# Patient Record
Sex: MALE | Race: WHITE | HISPANIC OR LATINO | Employment: FULL TIME | URBAN - METROPOLITAN AREA
[De-identification: names, ages, dates, MRNs, and addresses within clinical notes are randomized per-mention and may not be internally consistent; named-entity substitution may affect disease eponyms.]

---

## 2017-03-03 ENCOUNTER — NON-PROVIDER VISIT (OUTPATIENT)
Dept: URGENT CARE | Facility: CLINIC | Age: 57
End: 2017-03-03

## 2017-03-03 DIAGNOSIS — Z02.1 PRE-EMPLOYMENT DRUG SCREENING: ICD-10-CM

## 2017-03-03 LAB
AMP AMPHETAMINE: NORMAL
COC COCAINE: NORMAL
INT CON NEG: NORMAL
INT CON POS: NORMAL
MET METHAMPHETAMINES: NORMAL
OPI OPIATES: NORMAL
PCP PHENCYCLIDINE: NORMAL
POC DRUG COMMENT 753798-OCCUPATIONAL HEALTH: NORMAL
THC: NORMAL

## 2017-03-03 PROCEDURE — 80305 DRUG TEST PRSMV DIR OPT OBS: CPT | Performed by: PHYSICIAN ASSISTANT

## 2017-05-03 ENCOUNTER — OFFICE VISIT (OUTPATIENT)
Dept: URGENT CARE | Facility: CLINIC | Age: 57
End: 2017-05-03

## 2017-05-03 VITALS
DIASTOLIC BLOOD PRESSURE: 80 MMHG | WEIGHT: 155 LBS | BODY MASS INDEX: 24.91 KG/M2 | HEART RATE: 79 BPM | RESPIRATION RATE: 16 BRPM | TEMPERATURE: 98.7 F | HEIGHT: 66 IN | OXYGEN SATURATION: 95 % | SYSTOLIC BLOOD PRESSURE: 100 MMHG

## 2017-05-03 DIAGNOSIS — J06.9 VIRAL UPPER RESPIRATORY ILLNESS: ICD-10-CM

## 2017-05-03 PROCEDURE — 99203 OFFICE O/P NEW LOW 30 MIN: CPT | Performed by: FAMILY MEDICINE

## 2017-05-03 RX ORDER — ACETAMINOPHEN 325 MG/1
650 TABLET ORAL EVERY 4 HOURS PRN
COMMUNITY
End: 2021-03-06

## 2017-05-03 RX ORDER — CODEINE PHOSPHATE AND GUAIFENESIN 10; 100 MG/5ML; MG/5ML
5 SOLUTION ORAL EVERY 6 HOURS PRN
Qty: 240 ML | Refills: 1 | Status: SHIPPED | OUTPATIENT
Start: 2017-05-03 | End: 2021-03-06

## 2017-05-03 ASSESSMENT — ENCOUNTER SYMPTOMS
NECK PAIN: 0
MYALGIAS: 1
BLURRED VISION: 0
COUGH: 1
FATIGUE: 1
ANOREXIA: 0
VOMITING: 0
ARTHRALGIAS: 1
HEADACHES: 1
DIZZINESS: 0
CHILLS: 0
NAUSEA: 0
SORE THROAT: 1
BODY ACHES: 1
FEVER: 0
ABDOMINAL PAIN: 0
DOUBLE VISION: 0
SWOLLEN GLANDS: 0

## 2017-05-03 NOTE — PROGRESS NOTES
"Subjective:      Bennett Bailey is a 56 y.o. male who presents with Generalized Body Aches            Generalized Body Aches  This is a new problem. The current episode started in the past 7 days (3-4 days). The problem occurs constantly. The problem has been gradually worsening. Associated symptoms include arthralgias, congestion, coughing, fatigue, headaches, myalgias and a sore throat. Pertinent negatives include no abdominal pain, anorexia, chest pain, chills, fever, nausea, neck pain, rash, swollen glands or vomiting. Exacerbated by: nighttime. Treatments tried: PCN from underground. The treatment provided no relief.       Review of Systems   Constitutional: Positive for fatigue. Negative for fever and chills.   HENT: Positive for congestion and sore throat.    Eyes: Negative for blurred vision and double vision.   Respiratory: Positive for cough.    Cardiovascular: Negative for chest pain.   Gastrointestinal: Negative for nausea, vomiting, abdominal pain and anorexia.   Musculoskeletal: Positive for myalgias and arthralgias. Negative for neck pain.   Skin: Negative for rash.   Neurological: Positive for headaches. Negative for dizziness.     PMH:  has no past medical history on file.  MEDS:   Current outpatient prescriptions:   •  acetaminophen (TYLENOL) 325 MG Tab, Take 650 mg by mouth every four hours as needed., Disp: , Rfl:   ALLERGIES: No Known Allergies  SURGHX: History reviewed. No pertinent past surgical history.  SOCHX:  reports that he quit smoking about 30 years ago. He does not have any smokeless tobacco history on file. He reports that he drinks alcohol. He reports that he does not use illicit drugs.  FH: Family history was reviewed, no pertinent findings to report       Objective:     /80 mmHg  Pulse 79  Temp(Src) 37.1 °C (98.7 °F)  Resp 16  Ht 1.676 m (5' 6\")  Wt 70.308 kg (155 lb)  BMI 25.03 kg/m2  SpO2 95%     Physical Exam   Constitutional: He appears well-developed.   HENT: "   Head: Normocephalic.   Right Ear: External ear normal.   Left Ear: External ear normal.   Mouth/Throat: Oropharyngeal exudate present.   Nasal congestion    Eyes: Right eye exhibits no discharge. Left eye exhibits no discharge.   Neck: Normal range of motion. No tracheal deviation present. No thyromegaly present.   Cardiovascular: Normal rate.  Exam reveals no friction rub.    No murmur heard.  Pulmonary/Chest: Effort normal. No stridor. No respiratory distress. He has no wheezes.   Abdominal: Soft. He exhibits no distension. There is no tenderness. There is no guarding.   Lymphadenopathy:     He has no cervical adenopathy.   Neurological: He is alert.   Skin: Skin is warm and dry. He is not diaphoretic.   Psychiatric: He has a normal mood and affect. His behavior is normal.               Assessment/Plan:     1. Viral upper respiratory illness  mag hydrox-al hydrox-simeth-diphenhydrAMINE-lidocaine viscous 2%    guaifenesin-codeine (ROBITUSSIN AC) Solution oral solution     Supportive care  Push fluids  Monitor temperature  Follow-up if symptoms worsen or fail to improve

## 2017-05-03 NOTE — PATIENT INSTRUCTIONS
"Infección del tracto respiratorio superior, adultos  (Upper Respiratory Infection, Adult)  La mayoría de las infecciones del tracto respiratorio superior son infecciones virales de las vías que llevan el aire a los pulmones. Un infección del tracto respiratorio superior afecta la nariz, la garganta y las vías respiratorias superiores. El tipo más frecuente de infección del tracto respiratorio superior es la nasofaringitis, que habitualmente se conoce angelique \"resfrío común\".  Las infecciones del tracto respiratorio superior siguen oneill curso y por lo general se curan solas. En la mayoría de los casos, la infección del tracto respiratorio superior no requiere atención médica, rigoberto a veces, después de augusto infección viral, puede surgir augusto infección bacteriana en las vías respiratorias superiores. [place] se conoce angelique infección secundaria. Las infecciones sinusales y en el oído medio son tipos frecuentes de infecciones secundarias en el tracto respiratorio superior.  La neumonía bacteriana también puede complicar un cuadro de infección del tracto respiratorio superior. Blanca tipo de infección puede empeorar el asma y la enfermedad pulmonar obstructiva crónica (EPOC). En algunos casos, estas complicaciones pueden requerir atención médica de emergencia y poner en peligro la luis.   CAUSAS  Jolly todas las infecciones del tracto respiratorio superior se deben a los virus. Un virus es un tipo de microbio que puede contagiarse de augusto persona a otra.   FACTORES DE RIESGO  Puede estar en riesgo de sufrir augusto infección del tracto respiratorio superior si:   · Fuma.  · Tiene augusto enfermedad pulmonar o cardíaca crónica.  · Tiene debilitado el sistema de defensa (inmunitario) del cuerpo.  · Es muy joven o de edad muy avanzada.  · Tiene asma o alergias nasales.  · Trabaja en áreas donde hay mucha gente o poca ventilación.  · Trabaja en augusto escuela o en un centro de atención médica.  SIGNOS Y SÍNTOMAS   Habitualmente, los síntomas aparecen " de 2 a 3 días después de entrar en contacto con el virus del resfrío. La mayoría de las infecciones virales en el tracto respiratorio superior huerta de 7 a 10 días. Sin embargo, las infecciones virales en el tracto respiratorio superior a causa del virus de la gripe pueden durar de 14 a 18 días y, habitualmente, son más graves. Entre los síntomas se pueden incluir los siguientes:   · Secreción o congestión nasal.  · Estornudos.  · Tos.  · Dolor de garganta.  · Dolor de je.  · Fatiga.  · Fiebre.  · Pérdida del apetito.  · Dolor en la frente, detrás de los ojos y por encima de los pómulos (dolor sinusal).  · Jennifer musculares.  DIAGNÓSTICO   El médico puede diagnosticar augusto infección del tracto respiratorio superior mediante los siguientes estudios:  · Examen físico.  · Pruebas para verificar si los síntomas no se deben a otra afección, por ejemplo:  ¨ Faringitis estreptocócica.  ¨ Sinusitis.  ¨ Neumonía.  ¨ Asma.  TRATAMIENTO   Esta infección desaparece emilio, con el tiempo. No puede curarse con medicamentos, rigoberto a menudo se prescriben para aliviar los síntomas. Los medicamentos pueden ser útiles para lo siguiente:  · Bajar la fiebre.  · Reducir la tos.  · Aliviar la congestión nasal.  INSTRUCCIONES PARA EL CUIDADO EN EL HOGAR   · Munjor los medicamentos solamente angelique se lo haya indicado el médico.  · A fin de aliviar el dolor de garganta, hossein gárgaras con solución salina templada o consuma caramelos para la tos, angelique se lo haya indicado el médico.  · Use un humidificador de vapor cálido o inhale el vapor de la ducha para aumentar la humedad del aire. [place] facilitará la respiración.  · Shannon suficiente líquido para mantener la orina shahram o de color amarillo pálido.  · Consuma sopas y otros caldos transparentes, y aliméntese edward.  · Descanse todo lo que sea necesario.  · Regrese al trabajo cuando la temperatura se le haya normalizado o cuando el médico lo autorice. Es posible que deba quedarse en oneill casa garrick  un tiempo prolongado, para no infectar a los demás. También puede usar un barbijo y lavarse las peter con cuidado para evitar la propagación del virus.  · Aumente el uso del inhalador si tiene asma.  · No consuma ningún producto que contenga tabaco, lo que incluye cigarrillos, tabaco de mascar o cigarrillos electrónicos. Si necesita ayuda para dejar de fumar, consulte al médico.  PREVENCIÓN   La mejor manera de protegerse de un resfrío es mantener augusto higiene adecuada.   · Evite el contacto oral o físico con personas que tengan síntomas de resfrío.  · En jeovany de contacto, lávese las peter con frecuencia.  No hay pruebas claras de que la vitamina C, la vitamina E, la equinácea o el ejercicio reduzcan la probabilidad de contraer un resfrío. Sin embargo, siempre se recomienda descansar mucho, hacer ejercicio y alimentarse edward.   SOLICITE ATENCIÓN MÉDICA SI:   · Oneill estado empeora en lugar de mejorar.  · Los medicamentos no logran controlar los síntomas.  · Tiene escalofríos.  · La sensación de falta de aire empeora.  · Tiene mucosidad marrón o arielle.  · Tiene secreción nasal amarilla o marrón.  · Le duele la ariadna, especialmente al inclinarse hacia adelante.  · Tiene fiebre.  · Tiene los ganglios del rebecca hinchados.  · Siente dolor al tragar.  · Tiene zonas magnus en la parte de atrás de la garganta.  SOLICITE ATENCIÓN MÉDICA DE INMEDIATO SI:   · Tiene síntomas intensos o persistentes de:  ¨ Dolor de je.  ¨ Dolor de oídos.  ¨ Dolor sinusal.  ¨ Dolor en el pecho.  · Tiene enfermedad pulmonar crónica y cualquiera de estos síntomas:  ¨ Sibilancias.  ¨ Tos prolongada.  ¨ Tos con les.  ¨ Cambio en la mucosidad habitual.  · Presenta rigidez en el rebecca.  · Tiene cambios en:  ¨ La visión.  ¨ La audición.  ¨ El pensamiento.  ¨ El estado de ánimo.  ASEGÚRESE DE QUE:   · Comprende estas instrucciones.  · Controlará oneill afección.  · Recibirá ayuda de inmediato si no mejora o si empeora.     Esta información no tiene angelique  fin reemplazar el consejo del médico. Asegúrese de hacerle al médico cualquier pregunta que tenga.     Document Released: 09/27/2006 Document Revised: 05/03/2016  Elsevier Interactive Patient Education ©2016 Elsevier Inc.      AFRIN 12 horas, no usar mas de augusto vez al esperanza por 4-6 soto

## 2017-06-02 ENCOUNTER — OFFICE VISIT (OUTPATIENT)
Dept: URGENT CARE | Facility: CLINIC | Age: 57
End: 2017-06-02

## 2017-06-02 VITALS
RESPIRATION RATE: 16 BRPM | BODY MASS INDEX: 25.55 KG/M2 | HEART RATE: 72 BPM | SYSTOLIC BLOOD PRESSURE: 110 MMHG | OXYGEN SATURATION: 97 % | HEIGHT: 66 IN | TEMPERATURE: 98.4 F | DIASTOLIC BLOOD PRESSURE: 74 MMHG | WEIGHT: 159 LBS

## 2017-06-02 DIAGNOSIS — Z02.4 DRIVER'S PERMIT PHYSICAL EXAMINATION: ICD-10-CM

## 2017-06-02 PROCEDURE — 7100 PR DOT PHYSICAL: Performed by: NURSE PRACTITIONER

## 2017-06-02 ASSESSMENT — VISUAL ACUITY
OS_CC: 20/20
OD_CC: 20/20

## 2017-06-02 NOTE — MR AVS SNAPSHOT
"Bennett Bailey   2017 6:00 PM   Office Visit   MRN: [id number]    Department:  [facility]   Dept Phone:  [phone]    Description:  Male : 1960   Provider:  Cathey J Hamman, A.P.N.; Cathey J Hamman, A.P.N.           Reason for Visit     Annual Exam DOT       Allergies as of 2017     No Known Allergies      Vital Signs     Blood Pressure Pulse Temperature Respirations Height Weight    110/74 mmHg 72 36.9 °C (98.4 °F) 16 1.676 m (5' 6\") 72.122 kg (159 lb)    Body Mass Index Oxygen Saturation Smoking Status             25.68 kg/m2 97% Former Smoker         Basic Information     Date Of Birth Sex Race Ethnicity Preferred Language    1960 Male White  Origin (Khmer,Italian,Andorran,Joe, etc) English      Health Maintenance        Date Due Completion Dates    IMM DTaP/Tdap/Td Vaccine (1 - Tdap) 1978 ---    COLONOSCOPY 2009 ---            Current Immunizations     No immunizations on file.      Below and/or attached are the medications your provider expects you to take. Review all of your home medications and newly ordered medications with your provider and/or pharmacist. Follow medication instructions as directed by your provider and/or pharmacist. Please keep your medication list with you and share with your provider. Update the information when medications are discontinued, doses are changed, or new medications (including over-the-counter products) are added; and carry medication information at all times in the event of emergency situations     Allergies:  No Known Allergies          Medications  Valid as of: 2017 -  6:52 PM    Generic Name Brand Name Tablet Size Instructions for use    Acetaminophen (Tab) TYLENOL 325 MG Take 650 mg by mouth every four hours as needed.        Guaifenesin-Codeine (Solution) ROBITUSSIN -10 mg/5mL Take 5 mL by mouth every 6 hours as needed for Cough.        .                 Medicines prescribed today were " sent to:     None      Medication refill instructions:       If your prescription bottle indicates you have medication refills left, it is not necessary to call your provider’s office. Please contact your pharmacy and they will refill your medication.    If your prescription bottle indicates you do not have any refills left, you may request refills at any time through one of the following ways: The online Social Tools system (except Urgent Care), by calling your provider’s office, or by asking your pharmacy to contact your provider’s office with a refill request. Medication refills are processed only during regular business hours and may not be available until the next business day. Your provider may request additional information or to have a follow-up visit with you prior to refilling your medication.   *Please Note: Medication refills are assigned a new Rx number when refilled electronically. Your pharmacy may indicate that no refills were authorized even though a new prescription for the same medication is available at the pharmacy. Please request the medicine by name with the pharmacy before contacting your provider for a refill.           Social Tools Access Code: [email or web address]  Expires: 7/2/2017  4:17 AM    Your email address is not on file at [facility].  Email Addresses are required for you to sign up for Social Tools, please contact [phone] to verify your personal information and to provide your email address prior to attempting to register for Social Tools.    Bennett Bailey  [address] [address] [place] [address]    Social Tools  A secure, online tool to manage your health information     [facility]’s Social Tools® is a secure, online tool that connects you to your personalized health information from the privacy of your home -- day or night - making it very easy for you to manage your healthcare. Once the activation process is completed, you can even access your medical information using the Social Tools tejas,  which is available for free in the Apple Jonn store or Google Play store.     To learn more about [employer], visit [email or web address]    There are two levels of access available (as shown below):   My Chart Features  [facility] Doctor [facility]  Specialists [facility]  Urgent  Care Non-[facility] Primary Care Doctor   Email your healthcare team securely and privately 24/7 X X X    Manage appointments: schedule your next appointment; view details of past/upcoming appointments X      Request prescription refills. X      View recent personal medical records, including lab and immunizations X X X X   View health record, including health history, allergies, medications X X X X   Read reports about your outpatient visits, procedures, consult and ER notes X X X X   See your discharge summary, which is a recap of your hospital and/or ER visit that includes your diagnosis, lab results, and care plan X X  X     How to register for [employer]:  Once your e-mail address has been verified, follow the following steps to sign up for [employer].     1. Go to  [email or web address]  2. Click on the Sign Up Now box, which takes you to the New Member Sign Up page. You will need to provide the following information:  a. Enter your [employer] Platform Access Code exactly as it appears at the top of this page. (You will not need to use this code after you’ve completed the sign-up process. If you do not sign up before the expiration date, you must request a new code.)   b. Enter your date of birth.   c. Enter your home email address.   d. Click Submit, and follow the next screen’s instructions.  3. Create a TrustCloudt ID. This will be your [employer]Cloudt login ID and cannot be changed, so think of one that is secure and easy to remember.  4. Create a [employer] password. You can change your password at any time.  5. Enter your Password Reset Question and Answer. This can be used at a later time if you forget your password.   6. Enter your e-mail address. This  allows you to receive e-mail notifications when new information is available in [facility].  7. Click Sign Up. You can now view your health information.    For assistance activating your [employer] account, call [phone]

## 2017-06-03 NOTE — PROGRESS NOTES
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 Physical exam is within normal limits, please see form scanned into the patient's chart.

## 2017-07-03 ENCOUNTER — TELEPHONE (OUTPATIENT)
Dept: MEDICAL GROUP | Age: 57
End: 2017-07-03

## 2017-07-03 NOTE — TELEPHONE ENCOUNTER
NEW PATIENT VISIT PRE-VISIT PLANNING    1.  [employer] Patient is checked in Patient Demographics? YES    2.  Immunizations were updated in Epic using WebIZ?: No WebIZ record       •  Web Iz Recommendations:     3.  Is this appointment scheduled as a Hospital Follow-Up? No    4.  Patient is due for the following Health Maintenance Topics:   Health Maintenance Due   Topic Date Due   • IMM DTaP/Tdap/Td Vaccine (1 - Tdap) 05/11/1978   • COLONOSCOPY  05/11/2009           5.  Reviewed/Updated the following with patient:       •   Preferred Pharmacy? NO       •   Preferred Lab? NO       •   Medications? NO       •   Social History? NO       •   Family History? NO    6.  Updated Care Team?       •   DME Company (gait device, O2, CPAP, etc.) N\A       •   Other Specialists (eye doctor, derm, GYN, cardiology, endo, etc): N\A    7.  Patient was NOT informed to arrive 15 min prior to their scheduled appointment and bring in their medication bottles. Patient has no voice mail

## 2021-03-06 ENCOUNTER — OFFICE VISIT (OUTPATIENT)
Dept: URGENT CARE | Facility: CLINIC | Age: 61
End: 2021-03-06

## 2021-03-06 VITALS
OXYGEN SATURATION: 96 % | TEMPERATURE: 97.3 F | BODY MASS INDEX: 26.78 KG/M2 | RESPIRATION RATE: 16 BRPM | WEIGHT: 166.6 LBS | HEART RATE: 56 BPM | SYSTOLIC BLOOD PRESSURE: 112 MMHG | DIASTOLIC BLOOD PRESSURE: 76 MMHG | HEIGHT: 66 IN

## 2021-03-06 DIAGNOSIS — Z02.89 ENCOUNTER FOR EXAMINATION REQUIRED BY DEPARTMENT OF TRANSPORTATION (DOT): ICD-10-CM

## 2021-03-06 PROCEDURE — 7100 PR DOT PHYSICAL: Performed by: PHYSICIAN ASSISTANT

## 2021-03-06 ASSESSMENT — VISUAL ACUITY
OS_CC: 20 13
OD_CC: 20 13

## 2021-03-06 NOTE — PROGRESS NOTES
60 y.o. male comes in for a DOT physical. No major medical history, no chronic conditions, no chronic medications. No history of asthma, heart disease, murmurs, seizure disorder or syncopal episodes with activity.  Please see the chart for further information, however normal physical exam, cleared for DOT  For 2 years wearing corrective lenses.    UA- showed trace of blood. Advised consult with PCP.    The patient demonstrated a good understanding and agreed with the treatment plan.    Linda Salguero P.A.-C.
